# Patient Record
Sex: MALE | Race: OTHER | Employment: OTHER | URBAN - METROPOLITAN AREA
[De-identification: names, ages, dates, MRNs, and addresses within clinical notes are randomized per-mention and may not be internally consistent; named-entity substitution may affect disease eponyms.]

---

## 2017-02-14 ENCOUNTER — HOSPITAL ENCOUNTER (EMERGENCY)
Age: 37
Discharge: HOME | End: 2017-02-14
Attending: INTERNAL MEDICINE | Admitting: INTERNAL MEDICINE
Payer: SELF-PAY

## 2017-02-14 VITALS
HEART RATE: 89 BPM | OXYGEN SATURATION: 100 % | RESPIRATION RATE: 18 BRPM | TEMPERATURE: 98.6 F | SYSTOLIC BLOOD PRESSURE: 153 MMHG | DIASTOLIC BLOOD PRESSURE: 89 MMHG

## 2017-02-14 DIAGNOSIS — S61.512A LACERATION OF WRIST, LEFT, INITIAL ENCOUNTER: Primary | ICD-10-CM

## 2017-02-14 PROCEDURE — 77030031132 HC SUT NYL COVD -A

## 2017-02-14 PROCEDURE — 90715 TDAP VACCINE 7 YRS/> IM: CPT | Performed by: INTERNAL MEDICINE

## 2017-02-14 PROCEDURE — 90471 IMMUNIZATION ADMIN: CPT

## 2017-02-14 PROCEDURE — 99282 EMERGENCY DEPT VISIT SF MDM: CPT

## 2017-02-14 PROCEDURE — 75810000293 HC SIMP/SUPERF WND  RPR

## 2017-02-14 PROCEDURE — 74011250636 HC RX REV CODE- 250/636: Performed by: INTERNAL MEDICINE

## 2017-02-14 PROCEDURE — 77030018836 HC SOL IRR NACL ICUM -A

## 2017-02-14 RX ORDER — LIDOCAINE HYDROCHLORIDE 10 MG/ML
10 INJECTION, SOLUTION EPIDURAL; INFILTRATION; INTRACAUDAL; PERINEURAL ONCE
Status: DISCONTINUED | OUTPATIENT
Start: 2017-02-14 | End: 2017-02-14 | Stop reason: HOSPADM

## 2017-02-14 RX ADMIN — TETANUS TOXOID, REDUCED DIPHTHERIA TOXOID AND ACELLULAR PERTUSSIS VACCINE, ADSORBED 0.5 ML: 5; 2.5; 8; 8; 2.5 SUSPENSION INTRAMUSCULAR at 19:28

## 2017-02-15 NOTE — ED PROVIDER NOTES
Patient is a 39 y.o. male presenting with skin laceration. The history is provided by the patient. Laceration    The incident occurred 1 to 2 hours ago. The laceration is located on the left arm. The laceration is 3 cm in size. The injury mechanism is a metal edge. Foreign body present: no. The pain is at a severity of 4/10. The pain is mild. Pertinent negatives include no numbness, no tingling, no weakness and no loss of motion. It is unknown when the patient last had a tetanus shot. History reviewed. No pertinent past medical history. History reviewed. No pertinent past surgical history. History reviewed. No pertinent family history. Social History     Social History    Marital status:      Spouse name: N/A    Number of children: N/A    Years of education: N/A     Occupational History    Not on file. Social History Main Topics    Smoking status: Not on file    Smokeless tobacco: Not on file    Alcohol use Not on file    Drug use: Not on file    Sexual activity: Not on file     Other Topics Concern    Not on file     Social History Narrative    No narrative on file         ALLERGIES: Review of patient's allergies indicates no known allergies. Review of Systems   Constitutional: Negative for chills, fatigue and fever. HENT: Negative. Negative for sore throat. Eyes: Negative. Respiratory: Negative for cough and shortness of breath. Cardiovascular: Negative for chest pain and palpitations. Gastrointestinal: Negative for abdominal pain, nausea and vomiting. Genitourinary: Negative for dysuria. Musculoskeletal: Negative. Skin: Positive for wound. Laceration to left forearm   Neurological: Negative for dizziness, tingling, weakness, light-headedness, numbness and headaches. Psychiatric/Behavioral: Negative. All other systems reviewed and are negative.       Vitals:    02/14/17 1817   BP: 153/89   Pulse: 89   Resp: 18   Temp: 98.6 °F (37 °C) SpO2: 100%            Physical Exam   Constitutional: He is oriented to person, place, and time. He appears well-developed and well-nourished. HENT:   Head: Normocephalic and atraumatic. Mouth/Throat: Oropharynx is clear and moist.   Eyes: Conjunctivae are normal. No scleral icterus. Neck: Neck supple. No JVD present. No tracheal deviation present. Cardiovascular: Normal rate, regular rhythm and normal heart sounds. Pulmonary/Chest: Effort normal and breath sounds normal. No respiratory distress. He has no wheezes. Abdominal: Soft. There is no tenderness. Musculoskeletal: Normal range of motion. Neurological: He is alert and oriented to person, place, and time. He has normal strength. Gait normal. GCS eye subscore is 4. GCS verbal subscore is 5. GCS motor subscore is 6. Skin: Skin is warm and dry. Laceration noted. Psychiatric: He has a normal mood and affect. Nursing note and vitals reviewed. MDM  Number of Diagnoses or Management Options  Diagnosis management comments: 7:51 PM  38 y/o male w/ 3 cm laceration to left forearm. Cut on metal object while at work earlier this evening. Tetanus updated. Wound cleaned and running simple suture placed with no complications. Wound dressed and bacitracin placed. All questions answered. Will have pt return in 10 days for suture removal.  All questions answered and patient in agreement with plan of care. Will plan for discharge.   Hector Huertas PA-C    Clinical Impression:  Forearm laceration    Risk of Complications, Morbidity, and/or Mortality  Presenting problems: low  Diagnostic procedures: low  Management options: low    Critical Care  Total time providing critical care: < 30 minutes    Patient Progress  Patient progress: stable    ED Course       Wound Repair  Date/Time: 2/14/2017 7:53 PM  Performed by: NPPreparation: skin prepped with Betadine  Pre-procedure re-eval: Immediately prior to the procedure, the patient was reevaluated and found suitable for the planned procedure and any planned medications. Time out: Immediately prior to the procedure a time out was called to verify the correct patient, procedure, equipment, staff and marking as appropriate. .  Location details: left wrist  Wound length:2.6 - 7.5 cm  Anesthesia: local infiltration    Anesthesia:  Anesthesia: local infiltration  Local Anesthetic: lidocaine 1% without epinephrine   Anesthetic total: 5 mL  Foreign bodies: no foreign bodies  Irrigation solution: saline  Irrigation method: syringe  Debridement: none  Skin closure: 4-0 nylon  Technique: running  Approximation: close  Dressing: 4x4, gauze roll and antibiotic ointment  Patient tolerance: Patient tolerated the procedure well with no immediate complications  My total time at bedside, performing this procedure was 1-15 minutes.

## 2017-02-15 NOTE — DISCHARGE INSTRUCTIONS
[place]: Instrucciones de cuidado - [ Cuts: Care Instructions ]  Instrucciones de cuidado  Un myke puede ocurrir en cualquier parte del cuerpo. A veces, se usan puntos de sutura, grapas, Jamba! para la piel o cintas Jaama 45 llamadas Steri-Strips para mantener juntos los bordes de un myke y ayudar a que sane. Las cintas Steri-Strip pueden usarse por sí solas o junto con puntos de sutura o grapas. Otras veces, se lamar los newman abiertos. Si el myke es profundo y [employer], es posible que el médico haya cerrado el myke en dos capas. Mayo capa más profunda de puntos de sutura junta la parte profunda del myke. Estos puntos se disuelven y no es necesario extraerlos. El cierre de la capa superior, que puede [place] por medio de puntos, [place], Steri-Strips o Jamba!, es lo que se puede sofiya sobre el myke. Con frecuencia, un myke se cubre con mayo venda. El médico lo ha examinado minuciosamente, stevie pueden presentarse problemas más tarde. Si nota algún problema o nuevos síntomas, busque tratamiento médico de inmediato. La atención de seguimiento es mayo parte clave de sal tratamiento y seguridad. Asegúrese de hacer y acudir a todas las citas, y llame a sal médico si está teniendo problemas. También es mayo buena idea saber los resultados de whitney exámenes y mantener mayo lista de los medicamentos que lacey. ¿Cómo puede cuidarse en el hogar? Si un myke está abierto o cerrado  · Apoye la millie afectada sobre mayo almohada en cualquier momento que esté sentado o acostado la los 3 días siguientes. Trate de mantenerla por encima del nivel del corazón. Jolly ayudará a reducir la hinchazón. · Mantenga la herida seca la las primeras 24 a 48 horas. Después de [address], puede ducharse si el médico lo [place]. Seque el myke con toques suaves de toalla. · No remoje el myke, alberto en mayo darin (bañera). Sal médico le indicará cuándo es seguro mojar el myke.   · Después de las primeras 24 a 48 horas, limpie el myke con agua y jabón 2 veces al día, a menos que el médico le dé indicaciones diferentes. ¨ No use peróxido de hidrógeno (agua [place]) ni alcohol, los cuales pueden retrasar la sanación. ¨ Puede cubrir el myke con mayo capa delgada de vaselina y mayo venda antiadherente. ¨ Si el médico colocó mayo venda sobre el myke, colóquese mayo venda nueva después de limpiar el myke o si la venda se moja o se ensucia. · Evite cualquier actividad que pudiera hacer que el myke se cecilia de nuevo. · Sea yasmine con los medicamentos. Therese y siga todas las indicaciones de la [place]. ¨ Si el médico le recetó un analgésico (medicamento para el dolor), tómelo según las indicaciones. ¨ Si no está tomando un analgésico recetado, pregúntele a head médico si puede marisela paul de [employer]. Si se cerró el myke con puntos, grapas o Steri-Strips  · Siga las instrucciones anteriores para los newman abiertos o cerrados. · No se quite los puntos o las grapas por sí mismo. El médico le indicará cuándo debe volver para que le quiten los [address] grapas. · Deje puestas las Steri-Strips hasta que se desprendan por sí solas. Si se cerró el myke con un adhesivo para la piel  · Siga las instrucciones anteriores para los newman abiertos o cerrados. · Déjese puesto el [employer] sobre la piel hasta que se desprenda por sí solo. [place] puede tardar entre 5 y 7 días. · No se rasque, frote ni hurgue el [employer]. · No se aplique la parte pegajosa de mayo venda directamente sobre el [employer]. · No se aplique ningún tipo de pomada, crema o loción sobre la millie. [place] puede hacer que el [employer] se desprenda demasiado pronto. No use peróxido de hidrógeno (agua [place]) ni alcohol, los cuales pueden retrasar la sanación. ¿Cuándo debe pedir ayuda? Llame a head médico ahora mismo o busque atención médica inmediata si:  · Tiene dolor nuevo, o el dolor empeora. · La piel cerca del myke está fría o pálida, o cambia de color.   · Siente hormigueo, debilitamiento o entumecimiento cerca del myke. · El myke comienza a sangrar, y la colin empapa la venda. Es normal que salga mayo pequeña cantidad de [place]. · Tiene dificultades para  la millie cercana al myke. · Tiene síntomas de infección, tales alberto:  ¨ Aumento del dolor, la hinchazón, la temperatura o el enrojecimiento alrededor del myke. ¨ Vetas rojizas que comienzan en el myke. ¨ Pus que sale del myke. Janina Dolin. Vigile de cerca los cambios en head niecy y asegúrese de comunicarse con head médico si:  · El myke vuelve a abrirse. · No mejora alberto se esperaba. ¿Dónde puede encontrar más información en inglés? Terry Harvey a [email or web address]-rogerio.info/. Salome Solo [id number] en la búsqueda para aprender más acerca de \"[place]: Instrucciones de cuidado - [ Cuts: Care Instructions ]. \"  Revisado: 27 McDonald, 2016  Versión del contenido: 11.1  © 2781-5648 Healthwise, Incorporated. Las instrucciones de cuidado fueron adaptadas bajo licencia por [employer] (which disclaims liability or warranty for this information). Si usted tiene [place] afección médica o sobre estas instrucciones, siempre pregunte a head profesional de niecy. Healthwise, Incorporated niega toda garantía o responsabilidad por head uso de esta información.

## 2017-02-24 ENCOUNTER — HOSPITAL ENCOUNTER (EMERGENCY)
Age: 37
Discharge: HOME | End: 2017-02-24
Attending: EMERGENCY MEDICINE
Payer: SELF-PAY

## 2017-02-24 VITALS
HEART RATE: 70 BPM | TEMPERATURE: 97.4 F | OXYGEN SATURATION: 99 % | DIASTOLIC BLOOD PRESSURE: 94 MMHG | SYSTOLIC BLOOD PRESSURE: 138 MMHG | WEIGHT: 280 LBS | RESPIRATION RATE: 16 BRPM

## 2017-02-24 DIAGNOSIS — Z48.02 VISIT FOR SUTURE REMOVAL: Primary | ICD-10-CM

## 2017-02-24 PROCEDURE — 75810000275 HC EMERGENCY DEPT VISIT NO LEVEL OF CARE

## 2017-02-25 NOTE — ED PROVIDER NOTES
Patient is a 39 y.o. male presenting with suture removal. The history is provided by the patient. Suture Removal     [address] is a 39 y.o. male presents with c/o suture removal. Had sutures placed 10 days ago at this facility. Denies fever or Pain. No past medical history on file. No past surgical history on file. No family history on file. Social History     Social History    Marital status:      Spouse name: N/A    Number of children: N/A    Years of education: N/A     Occupational History    Not on file. Social History Main Topics    Smoking status: Not on file    Smokeless tobacco: Not on file    Alcohol use No    Drug use: No    Sexual activity: Not on file     Other Topics Concern    Not on file     Social History Narrative         ALLERGIES: Review of patient's allergies indicates no known allergies. Review of Systems  Constitutional:  Denies malaise, fever, chills. Head:  Denies injury. Face:  Denies injury or pain. ENMT:  Denies sore throat. Neck:  Denies injury or pain. Chest:  Denies injury. Cardiac:  Denies chest pain or palpitations. Respiratory:  Denies cough, wheezing, difficulty breathing, shortness of breath. GI/ABD:  Denies injury, pain, distention, nausea, vomiting, diarrhea. :  Denies injury, pain, dysuria or urgency. Back:  Denies injury or pain. Pelvis:  Denies injury or pain. Extremity/MS:  Suture removal  Neuro:  Denies headache, LOC, dizziness, neurologic symptoms/deficits/paresthesias. Skin: Denies injury, rash, itching or skin changes. Vitals:    02/24/17 2033   BP: (!) 138/94   Pulse: 70   Resp: 16   Temp: 97.4 °F (36.3 °C)   SpO2: 99%   Weight: 127 kg (280 lb)            Physical Exam   Nursing note and vitals reviewed. CONSTITUTIONAL: Alert, in no apparent distress; well-developed; well-nourished. RESP: Chest clear, equal breath sounds. CV: S1 and S2 WNL; No murmurs, gallops or rubs.     UPPER EXT: Left forearm with well healed laceration. No visible signs of infection. LOWER EXT: Normal inspection. NEURO: strength 5/5 and sym, sensation intact. SKIN: No rashes; Normal for age and stage. PSYCH:  Alert and oriented, normal affect. MDM  Number of Diagnoses or Management Options  Elevated blood pressure:   Visit for suture removal:   Diagnosis management comments: IMPRESSION AND MEDICAL DECISION MAKING:  Based upon the patient's presentation with noted HPI and PE, along with the work up done in the emergency department, I believe that the patient has a well healed laceration. Suture removed. No complications. Will have him return if needed. PROGRESS NOTE: One or more blood pressure readings were noted elevated during the Pt's presentation in the emergency department this date. This abnormal reading has been cited in the Pt's diagnosis, and they have been encouraged to follow up with their primary care physician, or referred to a consultant for further evaluation and treatment. Pt advised of elevated BP. Advised Pt the need for follow up for the elevated BP. Advised Pt to monitor and record BP readings. ACEP guidelines are  Initiating treatment for asymptomatic hypertension in the ED is not necessary when patients have follow-up; (2) Rapidly lowering blood pressure in asymptomatic patients in the ED is unnecessary and may be harmful in some patients; (3) When ED treatment for asymptomatic hypertension is initiated, blood pressure management should attempt to gradually lower blood pressure and should not be expected to be normalized during the initial ED visit.          ED Course       Suture/Staple Removal  Date/Time: 2/24/2017 9:43 PM  Performed by: Sky Britton  Authorized by: Harmeet ELLIS     Consent:     Consent obtained:  Verbal    Consent given by:  Patient    Risks discussed:  Bleeding, pain and wound separation  Location:     Location:  Upper extremity    Upper extremity location:  Arm Arm location:  L lower arm  Procedure details:     Wound appearance:  No signs of infection    Number of sutures removed:  1  Post-procedure details:     Post-removal:  No dressing applied    Patient tolerance of procedure: Tolerated well, no immediate complications        Vitals:  Patient Vitals for the past 12 hrs:   Temp Pulse Resp BP SpO2   02/24/17 2033 97.4 °F (36.3 °C) 70 16 (!) 138/94 99 %         Medications ordered:   Medications - No data to display      Lab findings:  No results found for this or any previous visit (from the past 12 hour(s)). EKG interpretation by ED Physician:      X-Ray, CT or other radiology findings or impressions:  No orders to display       Progress notes, Consult notes or additional Procedure notes:       Disposition:  Diagnosis:   1. Visit for suture removal    2. Elevated blood pressure        Disposition:   9:44 PM  Pt reevaluated at this time and is resting comfortably in NAD. Discussed results and findings, as well as, diagnosis and plan for discharge. Pt verbalizes understanding and agreement with plan. All questions addressed at this time.      Follow-up Information     Follow up With Details Comments [address] Schedule an appointment as soon as possible for a visit As needed [address] [facility] Amaury Hassan    [address] EMERGENCY DEPT  If symptoms worsen [address]  [phone]           Patient's Medications    No medications on file

## 2017-02-25 NOTE — DISCHARGE INSTRUCTIONS
Dieta DASH: Instrucciones de cuidado - [ DASH Diet: Care Instructions ]  Instrucciones de cuidado  La dieta DASH es un plan de alimentación que puede ayudar a bajar la presión arterial. DASH es la sigla en inglés de \"Dietary Approaches to Stop Hypertension\" (medidas dietéticas para disminuir la hipertensión). Hipertensión significa presión arterial norma. La dieta DASH se concentra en el consumo de alimentos con alto contenido de calcio, potasio y Kimberly. Estos nutrientes pueden disminuir la presión arterial. Los alimentos con el mayor contenido de [place] nutrientes son las frutas, las verduras, los productos lácteos de bajo contenido de [place], las nueces, las semillas y las legumbres. Jason marisela suplementos de calcio, potasio y magnesio, en lugar de comer alimentos ricos en estos nutrientes, no tiene el mismo efecto. La dieta DASH también incluye granos integrales, pescado y aves. La dieta DASH es paul de los cambios de estilo de [place] que quizá le recomiende head médico para reducir la presión arterial norma. Es posible que head médico también quiera que usted reduzca la cantidad de sodio en head Elsa Artie. Reducir el sodio mientras sigue la dieta DASH puede bajar la presión arterial incluso más que únicamente con la dieta DASH. La atención de seguimiento es mayo parte clave de head tratamiento y seguridad. Asegúrese de hacer y acudir a todas las citas, y llame a head médico si está teniendo problemas. También es mayo buena idea saber los resultados de whitney exámenes y mantener mayo lista de los medicamentos que lacey. ¿Cómo puede cuidarse en el hogar? Cómo seguir la dieta DASH  · Coma entre 4 y 5 porciones de fruta al día. Mayo porción incluye 1 fruta de [place], ½ taza de fruta enlatada o cortada en trozos, 1/4 taza de fruta seca o 4 onzas (½ taza) de jugo de frutas. Elija fruta con más frecuencia que jugo. · Consuma entre 4 y 11 porciones de verduras al día.  Mayo porción incluye 1 taza de Marissa o de verduras de hojas crudas, ½ taza de verduras cocidas o cortadas en trozos, o 4 onzas (½ taza) de jugo de verduras. Elija comer verduras con más frecuencia que marisela head jugo. · Consuma entre 2 y 3 porciones de lácteos semidescremados y descremados al día. Mayo porción incluye 8 onzas de [place], 1 taza de yogur o 1½ onzas de [place]elel-barre. · Coma entre 6 y 6 porciones de granos todos los [address] Mayo porción incluye 1 rebanada de pan, 1 onza de cereal seco, o ½ taza de arroz, pasta o cereal cocido. Trate de elegir productos de grano integral con la mayor frecuencia posible. · Limite la cantidad de [facility] [place] aves y pescado a 2 porciones al día. Jerzy Inou[place] porción son 3 onzas, lo que es aproximadamente el tamaño de un joanthon de naipes. · Coma entre 4 y 5 porciones de nueces, semillas y legumbres (frijoles [habichuelas], lentejas y arvejas [chícharos] secos y cocidos) a la semana. Mayo porción incluye 1/3 taza de nueces, 2 cucharadas de semillas o ½ taza de frijoles o arvejas cocidos. · [address] [facility] y aceites a 2 o 3 porciones al día. [place] porción es 1 cucharadita de aceite vegetal o 2 cucharadas de aderezo para ensaladas. · Limite los dulces y el azúcar adicional a 5 porciones o menos por semana. Jerzy [place] porción es 1 cucharada de [place] o [place], ½ taza de sorbete o 1 taza de limonada. · Consuma menos de 2,300 miligramos (mg) de sodio al día. Si limita head consumo de sodio a 1,500 mg al día, puede reducir head presión arterial aún más. Consejos para tener éxito  · Inicie con cambios pequeños. No intente hacer cambios radicales en head dieta de manera repentina. Podría sentir que extraña whitney comidas favoritas y, por lo [place], analilia mayo mayor probabilidad de que no cumpla el plan. Raul Michael y Kathleen. No List que esos cambios se conviertan en un hábito, incorpore algunos [employer]. · Pruebe algo de lo siguiente:  ¨ Fíjese el objetivo de comer mayo porción de fruta o de verduras en todas las comidas y los refrigerios.  Martita Bragg alcanzar la cantidad diaria recomendada de frutas y verduras. ¨ Pruebe comer yogur cubierto con frutas frescas y nueces alberto refrigerio o alberto postre saludable. ¨ Agregue dhara, tomate, pepino y cebolla a whitney sándwiches. ¨ Combine mayo masa de pizza precocida con queso mozzarella de bajo contenido de grasa (\"low-fat\") y cúbralo con abundantes verduras. Intente utilizar tomates, calabaza, espinaca, brócoli, zanahorias, coliflor y cebollas. ¨ Opte por comer mayo variedad de verduras cortadas en trozos con un aderezo de bajo contenido de grasa alberto refrigerio, en lugar de \"chips\" (tipo shayne fritas) y un \"dip\" (producto untable). ¨ [place] semillas de girasol o almendras picadas [employer]. O intente agregar nueces o almendras picadas a las verduras cocidas. ¨ Pruebe algunas comidas vegetarianas con frijoles y arvejas. Justin Miss o frijoles rojos a las ensaladas. Prepare burritos y tacos con puré de frijoles pintos o negros. ¿Dónde puede encontrar más información en inglés? Mayco Forman a [email or web address]-rogerio.info/. Ben Fuentes [id number] en la búsqueda para aprender más acerca de \"Dieta DASH: Instrucciones de cuidado - [ DASH Diet: Care Instructions ]. \"  Revisado: 23 marzo, 2016  Versión del contenido: 11.1  © 9853-0127 [employer]. Las instrucciones de cuidado fueron adaptadas bajo licencia por [employer] (which disclaims liability or warranty for this information). Si usted tiene [place] afección médica o sobre estas instrucciones, siempre pregunte a head profesional de niecy. [facility] niega toda garantía o responsabilidad por head uso de esta información.